# Patient Record
Sex: MALE | Race: WHITE | URBAN - METROPOLITAN AREA
[De-identification: names, ages, dates, MRNs, and addresses within clinical notes are randomized per-mention and may not be internally consistent; named-entity substitution may affect disease eponyms.]

---

## 2019-11-21 ENCOUNTER — NEW PATIENT (OUTPATIENT)
Dept: URBAN - METROPOLITAN AREA CLINIC 24 | Facility: CLINIC | Age: 29
End: 2019-11-21
Payer: MEDICAID

## 2019-11-21 DIAGNOSIS — T15.02XA FOREIGN BODY IN CORNEA, LEFT EYE: Primary | ICD-10-CM

## 2019-11-21 PROCEDURE — 65222 REMOVE FOREIGN BODY FROM EYE: CPT | Performed by: OPTOMETRIST

## 2019-11-21 PROCEDURE — 92002 INTRM OPH EXAM NEW PATIENT: CPT | Performed by: OPTOMETRIST

## 2019-11-21 ASSESSMENT — INTRAOCULAR PRESSURE
OD: 15
OS: 15

## 2020-02-20 ENCOUNTER — OFFICE VISIT (OUTPATIENT)
Dept: URBAN - METROPOLITAN AREA CLINIC 25 | Facility: CLINIC | Age: 30
End: 2020-02-20
Payer: MEDICAID

## 2020-02-20 DIAGNOSIS — T15.01XA FB IN CORNEA OF RIGHT EYE, INITIAL ENCOUNTER: Primary | ICD-10-CM

## 2020-02-20 PROCEDURE — 65222 REMOVE FOREIGN BODY FROM EYE: CPT | Performed by: OPTOMETRIST

## 2020-02-20 RX ORDER — OFLOXACIN 3 MG/ML
0.3 % SOLUTION/ DROPS OPHTHALMIC
Qty: 1 | Refills: 0 | Status: ACTIVE
Start: 2020-02-20

## 2020-02-20 NOTE — IMPRESSION/PLAN
Impression: FB in cornea of right eye, initial encounter: T15.01XA. Plan: pt edu. fb removed in office. no complications. rtc prn. rx ofoxacin.  ats prn